# Patient Record
Sex: MALE | Race: WHITE | NOT HISPANIC OR LATINO | Employment: OTHER | URBAN - METROPOLITAN AREA
[De-identification: names, ages, dates, MRNs, and addresses within clinical notes are randomized per-mention and may not be internally consistent; named-entity substitution may affect disease eponyms.]

---

## 2017-01-12 PROBLEM — E78.5 HYPERLIPIDEMIA: Status: ACTIVE | Noted: 2017-01-12

## 2017-01-12 PROBLEM — C62.11 MALIGNANT NEOPLASM OF DESCENDED RIGHT TESTIS (HCC): Status: ACTIVE | Noted: 2017-01-12

## 2017-01-12 PROBLEM — R31.9 HEMATURIA: Status: ACTIVE | Noted: 2017-01-12

## 2017-01-12 PROBLEM — N28.9 RENAL INSUFFICIENCY: Status: ACTIVE | Noted: 2017-01-12

## 2017-02-22 PROBLEM — E66.9 OBESITY: Status: ACTIVE | Noted: 2017-02-22

## 2018-04-16 ENCOUNTER — PATIENT OUTREACH (OUTPATIENT)
Dept: HEALTH INFORMATION MANAGEMENT | Facility: OTHER | Age: 73
End: 2018-04-16

## 2018-04-16 NOTE — PROGRESS NOTES
Attempt #:Final  [facility] Verified: yes  Verify PCP: yes    Communication Preference Obtained: no     Review Care Team: no    Annual Wellness Visit Scheduling  1. Scheduling Status:Not Scheduled. Patient states they are not interested / Pt's wife called and said that Nick already completed hisWV with his PCP/ No more info provided/ Not AWV info found/ Do not call.     Care Gap Scheduling (Attempt to Schedule EACH Overdue Care Gap!) Not able to address care gaps.  Health Maintenance Due   Topic Date Due   • Annual Wellness Visit  1945   • DIABETES MONOFILAMENT / LE EXAM  04/26/1946   • IMM DTaP/Tdap/Td Vaccine (1 - Tdap) 10/26/1965   • IMM PNEUMOCOCCAL 65+ (ADULT) LOW/MEDIUM RISK SERIES (1 of 2 - PCV13) 10/26/2011   • RETINAL SCREENING  01/12/2016       MyChart Activation: declined

## 2019-03-18 PROBLEM — R31.9 HEMATURIA: Status: RESOLVED | Noted: 2017-01-12 | Resolved: 2019-03-18

## 2022-11-30 PROBLEM — Z87.39 HX OF GOUT: Status: ACTIVE | Noted: 2022-11-30

## 2022-11-30 PROBLEM — Z87.438 HISTORY OF BPH: Status: ACTIVE | Noted: 2022-11-30

## 2022-11-30 PROBLEM — M54.2 CERVICAL SPINE PAIN: Status: ACTIVE | Noted: 2022-11-30
